# Patient Record
Sex: MALE | Race: WHITE | NOT HISPANIC OR LATINO
[De-identification: names, ages, dates, MRNs, and addresses within clinical notes are randomized per-mention and may not be internally consistent; named-entity substitution may affect disease eponyms.]

---
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
## 2022-07-28 ENCOUNTER — WALK IN (OUTPATIENT)
Dept: URGENT CARE | Age: 22
End: 2022-07-28

## 2022-07-28 VITALS
TEMPERATURE: 98.6 F | WEIGHT: 216.16 LBS | BODY MASS INDEX: 27.74 KG/M2 | RESPIRATION RATE: 14 BRPM | DIASTOLIC BLOOD PRESSURE: 80 MMHG | SYSTOLIC BLOOD PRESSURE: 120 MMHG | HEART RATE: 68 BPM | HEIGHT: 74 IN | OXYGEN SATURATION: 97 %

## 2022-07-28 DIAGNOSIS — Z02.5 SPORTS PHYSICAL: Primary | ICD-10-CM

## 2022-07-28 PROCEDURE — X0944 SELF PAY APN OR PA PERFORMED SPORTS PHYSICAL: HCPCS | Performed by: NURSE PRACTITIONER

## 2022-07-28 ASSESSMENT — ENCOUNTER SYMPTOMS
GASTROINTESTINAL NEGATIVE: 1
CONSTITUTIONAL NEGATIVE: 1
NEUROLOGICAL NEGATIVE: 1
PSYCHIATRIC NEGATIVE: 1
ENDOCRINE NEGATIVE: 1
ALLERGIC/IMMUNOLOGIC NEGATIVE: 1
RESPIRATORY NEGATIVE: 1
EYES NEGATIVE: 1
HEMATOLOGIC/LYMPHATIC NEGATIVE: 1

## 2022-07-28 ASSESSMENT — VISUAL ACUITY: OU: 1

## (undated) NOTE — ED AVS SNAPSHOT
Mr. Meredith Cisse   MRN: [id number]    Department:  [facility] in [place]   Date of Visit:  2/16/2019           Disclosure     Insurance plans vary and the physician(s) referred by the ER may not be covered by your plan.  [facility] tell this physician (or your personal doctor if your instructions are to return to your personal doctor) about any new or lasting problems. The primary care or specialist physician will see patients referred from the [facility] Emergency Department.  Ranjit Fishman

## (undated) NOTE — LETTER
Date & Time: 2/17/2019, 12:00 AM  Patient: Yovanny Lombardi  Encounter Provider(s):    Nellie Traylor DO       To Whom It May Concern:    Yovanny Lombardi was seen and treated in our department on 2/16/2019.  He should not return to work until c

## (undated) NOTE — LETTER
Date & Time: 2/16/2019, 11:59 PM  Patient: Shayna Roblero  Encounter Provider(s):    Morgan Musa DO       To Whom It May Concern:    Shayna Roblero was seen and treated in our department on 2/16/2019.  He should not participate in gym/spo